# Patient Record
Sex: FEMALE | Race: WHITE | NOT HISPANIC OR LATINO | URBAN - METROPOLITAN AREA
[De-identification: names, ages, dates, MRNs, and addresses within clinical notes are randomized per-mention and may not be internally consistent; named-entity substitution may affect disease eponyms.]

---

## 2018-01-11 NOTE — MISCELLANEOUS
Message   Recorded as Task   Date: 09/12/2017 12:06 PM, Created By: Serafin Tatum   Task Name: Follow Up   Assigned To: Margie Valenzuela   Regarding Patient: Adri Hardy, Status: Active   Comment:    rEin Aguilar - 12 Sep 2017 12:06 PM     TASK CREATED  Please call patient to schedule 2 yr  f/u appointment with office  Thank you  Per assigned task, I left message for Stana Form patient about scheduling an appointment at our office since patient is overdue for a follow up  Active Problems    1  Anxiety (300 00) (F41 9)   2  Arthritis (716 90) (M19 90)   3  Benign essential hypertension (401 1) (I10)   4  Benign neoplasm of thyroid (226) (D34)   5  GERD (gastroesophageal reflux disease) (530 81) (K21 9)   6  Hypercholesterolemia (272 0) (E78 00)   7  Obesity (278 00) (E66 9)   8  Postgastrectomy malabsorption (579 3) (K91 2,Z90 3)   9  Pre-operative cardiovascular examination (V72 81) (Z01 810)   10  Status post gastric bypass for obesity (V45 86) (Z98 84)   11  Vomiting (787 03) (R11 10)    Current Meds   1  Biotin TABS; Therapy: (Rd [place]) to Recorded   2  Calcium CAPS; Therapy: (Rd [place]) to Recorded   3  Colace CAPS; Therapy: (Recorded:29May2015) to Recorded   4  Flintstones Gummies Oral Tablet Chewable; Therapy: (Recorded:29May2015) to Recorded   5  Paxil 20 MG Oral Tablet (PARoxetine HCl); TAKE 1 TABLET DAILY; Therapy: (Recorded:11May2015) to Recorded   6  Probiotic CAPS; Therapy: (Recorded:29May2015) to Recorded   7  Protonix 40 MG Oral Tablet Delayed Release (Pantoprazole Sodium); TAKE 1 TABLET   DAILY; Therapy: (Recorded:11May2015) to Recorded   8  Vitamin B-12 500 MCG Sublingual Tablet Sublingual; PLACE MCG Daily; Therapy: (Recorded:11May2015) to Recorded   9  Vitamin D 1000 UNIT CAPS; TAKE CAPSULE Before meals; Therapy: (Recorded:11May2015) to Recorded   10  Xanax 0 25 MG Oral Tablet (ALPRAZolam); TAKE 1 TABLET DAILY AS NEEDED;     Therapy: (Recorded:11May2015) to Recorded    Allergies    1   Bactrim TABS    Signatures   Electronically signed by : Ana Skinner, ; Sep 12 2017  2:50PM EST                       (Author)

## 2018-01-13 NOTE — RESULT NOTES
Dear Masha Doyle,   Your test results have returned and are listed below:      Discussion/Summary  Your results show some abnormalities  5/10/16 labs reviewed  Your white blood cell count was slightly low at 4 4  White blood cells help the body fight off infection  IF you have had frequent illness or infection, you should follow-up with your PCP  During periods of rapid weight loss the white blood cells can also be temporarily low  If you are not having symptoms this is less likely to be concerning  It could also be a lab error  Your bun to creatinine ratio was a little high at 24  2-this tends to go up with dehydration  Keep in mind that when you "fast" for labs it is different than fasting for a procedure  When you fast for labs it is important to drink what the night before and the morning before you get labs done  It looks like you were a little dehydrated the day you got your labs done  KEEP UP THE GOOD WORK taking your supplements-your vitamin/mineral levels are within acceptable ranges  Your vitamin D is very low normal at 30  Please make sure that the TOTAL vitamin D3 with all your supplements added together provides between 1506-6681 IU per day  If you have any questions, please don't hesitate to call the office     Sincerely,      Signatures   Electronically signed by : Garth Cates, [facility]; Jun 7 2016  4:31PM EST                       (Author)

## 2018-10-03 ENCOUNTER — TELEPHONE (OUTPATIENT)
Dept: BARIATRICS | Facility: CLINIC | Age: 63
End: 2018-10-03

## 2018-10-03 NOTE — TELEPHONE ENCOUNTER
----- Message from John Frye RN sent at 10/2/2018  9:42 AM EDT -----  Regarding: 3 year follow up  Please make documented call to schedule patient for follow up office appointment    Thank you